# Patient Record
Sex: FEMALE | Race: BLACK OR AFRICAN AMERICAN
[De-identification: names, ages, dates, MRNs, and addresses within clinical notes are randomized per-mention and may not be internally consistent; named-entity substitution may affect disease eponyms.]

---

## 2017-10-03 ENCOUNTER — HOSPITAL ENCOUNTER (OUTPATIENT)
Dept: HOSPITAL 61 - MAMMO | Age: 57
Discharge: HOME | End: 2017-10-03
Attending: FAMILY MEDICINE
Payer: COMMERCIAL

## 2017-10-03 DIAGNOSIS — Z12.31: Primary | ICD-10-CM

## 2017-10-03 NOTE — RAD
DATE: 10/3/2017



EXAM: DIGITAL SCREEN BILAT W/CAD



HISTORY: Screening mammogram. Skin moles and lesions throughout both breasts.



COMPARISON: Mammogram from 2016, 2015 and 2014.



This study was interpreted with the benefit of Computerized Aided Detection

(CAD).



FINDINGS:



Breast Density: SCATTERED  The breast parenchyma shows scattered

fibroglandular densities. Breast parenchyma level B. 



 The skin and nipples are within normal limits.  No suspicious calcifications,

spiculated masses or areas of architectural distortion. Benign breast

calcification. Likely bilateral intraparenchymal lymph nodes.





IMPRESSION: No mammographic evidence of malignancy.





BI-RADS CATEGORY: 2 BENIGN FINDING(S)



RECOMMENDED FOLLOW-UP: 12M 12 MONTH FOLLOW-UP



PQRS compliance statement: Patient information was entered into a reminder

system with a target due date 10/3/2018 for the next mammogram.



Mammography is a sensitive method for finding small breast cancers, but it

does not detect them all and is not a substitute for careful clinical

examination.  A negative mammogram does not negate a clinically suspicious

finding and should not result in delay in biopsying a clinically suspicious

abnormality.



"Our facility is accredited by the [employer] Mammography

Program."

## 2018-10-12 ENCOUNTER — HOSPITAL ENCOUNTER (OUTPATIENT)
Dept: HOSPITAL 61 - MAMMO | Age: 58
Discharge: HOME | End: 2018-10-12
Attending: INTERNAL MEDICINE
Payer: COMMERCIAL

## 2018-10-12 DIAGNOSIS — Z12.31: Primary | ICD-10-CM

## 2018-10-12 PROCEDURE — 77067 SCR MAMMO BI INCL CAD: CPT

## 2018-10-15 NOTE — RAD
DATE: October 12, 2018



EXAM: DIGITAL SCREEN BILAT W/CAD



HISTORY: Screening study.



COMPARISON: 2016 and 2017



This study was interpreted with the benefit of Computerized Aided Detection

(CAD).







FINDINGS:



Breast Density: SCATTERED The breast parenchyma shows scattered fibroglandular

densities. Breast parenchyma level B..  There are no dominant suspicious

masses, suspicious microcalcifications or evidence of architectural

distortion.





IMPRESSION: No mammographic indicators for malignancy.







BI-RADS CATEGORY: 1 NEGATIVE



RECOMMENDED FOLLOW-UP: 12M 12 MONTH FOLLOW-UP



PQRS compliance statement: Patient information was entered into a reminder

system with a target due date October 13, 2019 for the next mammogram.



Mammography is a sensitive method for finding small breast cancers, but it

does not detect them all and is not a substitute for careful clinical

examination.  A negative mammogram does not negate a clinically suspicious

finding and should not result in delay in biopsying a clinically suspicious

abnormality.



"Our facility is accredited by the [employer] Mammography

Program."



The patient's breast density may affect the ability of mammography to detect

breast cancer. There are 4 categories of breast density, A, B, C and D. Breast

density A means that most of the breast tissue is replaced with adipose tissue

and therefore is not dense. Breast density B means that the breast tissue is

mildly dense and scattered. Breast density C means that the breast tissue is

heterogeneously dense. Breast density D means that the breast tissue is very

dense. Breast densities especially C and D may decrease the sensitivity of

mammography to detect breast cancer. Therefore, the patient may benefit from

3-D breast mammography (3D breast tomography) as a part of their screening

mammogram. Insurance may or may not pay for this additional imaging. The

patient's breast density based on today's mammogram is category B.

## 2019-03-09 ENCOUNTER — HOSPITAL ENCOUNTER (INPATIENT)
Dept: HOSPITAL 61 - ER | Age: 59
LOS: 3 days | Discharge: HOME | DRG: 440 | End: 2019-03-12
Attending: INTERNAL MEDICINE | Admitting: INTERNAL MEDICINE
Payer: COMMERCIAL

## 2019-03-09 VITALS — WEIGHT: 211.13 LBS | BODY MASS INDEX: 35.18 KG/M2 | HEIGHT: 65 IN

## 2019-03-09 DIAGNOSIS — R16.2: ICD-10-CM

## 2019-03-09 DIAGNOSIS — D25.9: ICD-10-CM

## 2019-03-09 DIAGNOSIS — K85.90: Primary | ICD-10-CM

## 2019-03-09 DIAGNOSIS — Z71.3: ICD-10-CM

## 2019-03-09 DIAGNOSIS — E11.9: ICD-10-CM

## 2019-03-09 DIAGNOSIS — K21.9: ICD-10-CM

## 2019-03-09 DIAGNOSIS — E66.9: ICD-10-CM

## 2019-03-09 LAB
ALBUMIN SERPL-MCNC: 3.9 G/DL (ref 3.4–5)
ALBUMIN/GLOB SERPL: 0.9 {RATIO} (ref 1–1.7)
ALP SERPL-CCNC: 92 U/L (ref 46–116)
ALT SERPL-CCNC: 21 U/L (ref 14–59)
ANION GAP SERPL CALC-SCNC: 11 MMOL/L (ref 6–14)
APTT PPP: YELLOW S
AST SERPL-CCNC: 14 U/L (ref 15–37)
BACTERIA #/AREA URNS HPF: (no result) /HPF
BASOPHILS # BLD AUTO: 0.1 X10^3/UL (ref 0–0.2)
BASOPHILS NFR BLD: 1 % (ref 0–3)
BILIRUB SERPL-MCNC: 0.4 MG/DL (ref 0.2–1)
BILIRUB UR QL STRIP: NEGATIVE
BUN SERPL-MCNC: 12 MG/DL (ref 7–20)
BUN/CREAT SERPL: 13 (ref 6–20)
CALCIUM SERPL-MCNC: 9.5 MG/DL (ref 8.5–10.1)
CHLORIDE SERPL-SCNC: 102 MMOL/L (ref 98–107)
CO2 SERPL-SCNC: 28 MMOL/L (ref 21–32)
CREAT SERPL-MCNC: 0.9 MG/DL (ref 0.6–1)
EOSINOPHIL NFR BLD: 0.2 X10^3/UL (ref 0–0.7)
EOSINOPHIL NFR BLD: 2 % (ref 0–3)
ERYTHROCYTE [DISTWIDTH] IN BLOOD BY AUTOMATED COUNT: 14.8 % (ref 11.5–14.5)
FIBRINOGEN PPP-MCNC: CLEAR MG/DL
GFR SERPLBLD BASED ON 1.73 SQ M-ARVRAT: 77.8 ML/MIN
GLOBULIN SER-MCNC: 4.4 G/DL (ref 2.2–3.8)
GLUCOSE SERPL-MCNC: 174 MG/DL (ref 70–99)
HCT VFR BLD CALC: 39.2 % (ref 36–47)
HGB BLD-MCNC: 13 G/DL (ref 12–15.5)
LIPASE: 1348 U/L (ref 73–393)
LYMPHOCYTES # BLD: 2.3 X10^3/UL (ref 1–4.8)
LYMPHOCYTES NFR BLD AUTO: 23 % (ref 24–48)
MCH RBC QN AUTO: 28 PG (ref 25–35)
MCHC RBC AUTO-ENTMCNC: 33 G/DL (ref 31–37)
MCV RBC AUTO: 84 FL (ref 79–100)
MONO #: 0.8 X10^3/UL (ref 0–1.1)
MONOCYTES NFR BLD: 8 % (ref 0–9)
NEUT #: 6.6 X10^3UL (ref 1.8–7.7)
NEUTROPHILS NFR BLD AUTO: 66 % (ref 31–73)
NITRITE UR QL STRIP: NEGATIVE
PH UR STRIP: 5.5 [PH]
PLATELET # BLD AUTO: 272 X10^3/UL (ref 140–400)
POTASSIUM SERPL-SCNC: 4 MMOL/L (ref 3.5–5.1)
PROT SERPL-MCNC: 8.3 G/DL (ref 6.4–8.2)
PROT UR STRIP-MCNC: NEGATIVE MG/DL
RBC # BLD AUTO: 4.66 X10^6/UL (ref 3.5–5.4)
RBC #/AREA URNS HPF: 0 /HPF (ref 0–2)
SODIUM SERPL-SCNC: 141 MMOL/L (ref 136–145)
SQUAMOUS #/AREA URNS LPF: (no result) /LPF
UROBILINOGEN UR-MCNC: 0.2 MG/DL
WBC # BLD AUTO: 10 X10^3/UL (ref 4–11)
WBC #/AREA URNS HPF: (no result) /HPF (ref 0–4)

## 2019-03-09 PROCEDURE — 80053 COMPREHEN METABOLIC PANEL: CPT

## 2019-03-09 PROCEDURE — 36415 COLL VENOUS BLD VENIPUNCTURE: CPT

## 2019-03-09 PROCEDURE — 84484 ASSAY OF TROPONIN QUANT: CPT

## 2019-03-09 PROCEDURE — 80061 LIPID PANEL: CPT

## 2019-03-09 PROCEDURE — 80076 HEPATIC FUNCTION PANEL: CPT

## 2019-03-09 PROCEDURE — 93005 ELECTROCARDIOGRAM TRACING: CPT

## 2019-03-09 PROCEDURE — 81025 URINE PREGNANCY TEST: CPT

## 2019-03-09 PROCEDURE — 96374 THER/PROPH/DIAG INJ IV PUSH: CPT

## 2019-03-09 PROCEDURE — 74177 CT ABD & PELVIS W/CONTRAST: CPT

## 2019-03-09 PROCEDURE — 82962 GLUCOSE BLOOD TEST: CPT

## 2019-03-09 PROCEDURE — 83690 ASSAY OF LIPASE: CPT

## 2019-03-09 PROCEDURE — 81001 URINALYSIS AUTO W/SCOPE: CPT

## 2019-03-09 PROCEDURE — 87086 URINE CULTURE/COLONY COUNT: CPT

## 2019-03-09 PROCEDURE — 82787 IGG 1 2 3 OR 4 EACH: CPT

## 2019-03-09 PROCEDURE — 85025 COMPLETE CBC W/AUTO DIFF WBC: CPT

## 2019-03-09 NOTE — PHYS DOC
Adult General


Chief Complaint


Chief Complaint:  ABDOMINAL PAIN





HPI


HPI





Patient is a 59  year old f wtih cc of abdo pain


epigastric x 3 days radiates to back wosre wtih eating, no fever sharp took 

motrin with minimal relief


no etoh


s/p kirit in the past.


on trulicity for diabetes.





Review of Systems


Review of Systems





Constitutional: Denies fever or chills []


Eyes: Denies change in visual acuity, redness, or eye pain []


HENT: Denies nasal congestion or sore throat []


Respiratory: Denies cough or shortness of breath []





Musculoskeletal: Denies back pain or joint pain []


Integument: Denies rash or skin lesions []


Neurologic: Denies headache, focal weakness or sensory changes []


Endocrine: Denies polyuria or polydipsia []





All other systems were reviewed and found to be within normal limits, except as 

documented in this note.





Current Medications


Current Medications





Current Medications








 Medications


  (Trade)  Dose


 Ordered  Sig/Scott  Start Time


 Stop Time Status Last Admin


Dose Admin


 


 Acetaminophen


  (Tylenol)  1,000 mg  1X  ONCE  3/10/19 00:00


 3/10/19 00:28 DC 3/10/19 00:38


1,000 MG


 


 Multi-Ingredient


 Mouthwash/Gargle


  (Gi Cocktail)  20 ml  1X  ONCE  3/10/19 00:00


 3/10/19 00:28 DC 3/10/19 00:38


20 ML


 


 Sodium Chloride  1,000 ml @ 


 1,000 mls/hr  1X  ONCE  3/10/19 00:00


 3/10/19 00:59 DC 3/10/19 00:40


1,000 MLS/HR











Physical Exam


Physical Exam





Constitutional: Well developed, well nourished, no acute distress, non-toxic 

appearance. []


HENT: Normocephalic, atraumatic, bilateral external ears normal, oropharynx 

moist, no oral exudates, nose normal. []


Eyes: PERRLA, EOMI, conjunctiva normal, no discharge. [] 


Neck: Normal range of motion, no tenderness, supple, no stridor. [] 


Cardiovascular:mild tachy


Lungs & Thorax:  Bilateral breath sounds clear to auscultation []


Abdomen: Bowel sounds normal, soft, epigastric tenderness, no masses, no 

pulsatile masses. [] 


Skin: Warm, dry, no erythema, no rash. [] 


Back: No tenderness, no CVA tenderness. [] 


Extremities: No tenderness, no cyanosis, no clubbing, ROM intact, no edema. [] 


Neurologic: Alert and oriented X 3, normal motor function, normal sensory 

function, no focal deficits noted. []


Psychologic: Affect normal, judgement normal, mood normal. []





Current Patient Data


Vital Signs





 Vital Signs








  Date Time  Temp Pulse Resp B/P (MAP) Pulse Ox O2 Delivery O2 Flow Rate FiO2


 


3/9/19 23:39 97.6 116 18 143/88 (106) 96   





 97.6       








Lab Values





 Laboratory Tests








Test


 3/9/19


22:35 3/9/19


22:48 3/9/19


23:35


 


Urine Collection Type Unknown    


 


Urine Color Yellow    


 


Urine Clarity Clear    


 


Urine pH 5.5    


 


Urine Specific Gravity 1.025    


 


Urine Protein


 Negative mg/dL


(NEG-TRACE) 


 





 


Urine Glucose (UA)


 Negative mg/dL


(NEG) 


 





 


Urine Ketones (Stick)


 Negative mg/dL


(NEG) 


 





 


Urine Blood


 Negative (NEG)


 


 





 


Urine Nitrite


 Negative (NEG)


 


 





 


Urine Bilirubin


 Negative (NEG)


 


 





 


Urine Urobilinogen Dipstick


 0.2 mg/dL (0.2


mg/dL) 


 





 


Urine Leukocyte Esterase Small (NEG)    


 


Urine RBC 0 /HPF (0-2)    


 


Urine WBC


 1-4 /HPF (0-4)


 


 





 


Urine Squamous Epithelial


Cells Many /LPF  


 


 





 


Urine Bacteria


 Few /HPF


(0-FEW) 


 





 


Urine Mucus Marked /LPF    


 


POC Urine HCG, Qualitative


 


 Hcg negative


(Negative) 





 


White Blood Count


 


 


 10.0 x10^3/uL


(4.0-11.0)


 


Red Blood Count


 


 


 4.66 x10^6/uL


(3.50-5.40)


 


Hemoglobin


 


 


 13.0 g/dL


(12.0-15.5)


 


Hematocrit


 


 


 39.2 %


(36.0-47.0)


 


Mean Corpuscular Volume


 


 


 84 fL ()





 


Mean Corpuscular Hemoglobin   28 pg (25-35)  


 


Mean Corpuscular Hemoglobin


Concent 


 


 33 g/dL


(31-37)


 


Red Cell Distribution Width


 


 


 14.8 %


(11.5-14.5)  H


 


Platelet Count


 


 


 272 x10^3/uL


(140-400)


 


Neutrophils (%) (Auto)   66 % (31-73)  


 


Lymphocytes (%) (Auto)   23 % (24-48)  L


 


Monocytes (%) (Auto)   8 % (0-9)  


 


Eosinophils (%) (Auto)   2 % (0-3)  


 


Basophils (%) (Auto)   1 % (0-3)  


 


Neutrophils # (Auto)


 


 


 6.6 x10^3uL


(1.8-7.7)


 


Lymphocytes # (Auto)


 


 


 2.3 x10^3/uL


(1.0-4.8)


 


Monocytes # (Auto)


 


 


 0.8 x10^3/uL


(0.0-1.1)


 


Eosinophils # (Auto)


 


 


 0.2 x10^3/uL


(0.0-0.7)


 


Basophils # (Auto)


 


 


 0.1 x10^3/uL


(0.0-0.2)


 


Sodium Level


 


 


 141 mmol/L


(136-145)


 


Potassium Level


 


 


 4.0 mmol/L


(3.5-5.1)


 


Chloride Level


 


 


 102 mmol/L


()


 


Carbon Dioxide Level


 


 


 28 mmol/L


(21-32)


 


Anion Gap   11 (6-14)  


 


Blood Urea Nitrogen


 


 


 12 mg/dL


(7-20)


 


Creatinine


 


 


 0.9 mg/dL


(0.6-1.0)


 


Estimated GFR


(Cockcroft-Gault) 


 


 77.8  





 


BUN/Creatinine Ratio   13 (6-20)  


 


Glucose Level


 


 


 174 mg/dL


(70-99)  H


 


Calcium Level


 


 


 9.5 mg/dL


(8.5-10.1)


 


Total Bilirubin


 


 


 0.4 mg/dL


(0.2-1.0)


 


Aspartate Amino Transferase


(AST) 


 


 14 U/L (15-37)


L


 


Alanine Aminotransferase (ALT)


 


 


 21 U/L (14-59)





 


Alkaline Phosphatase


 


 


 92 U/L


()


 


Troponin I Quantitative


 


 


 < 0.017 ng/mL


(0.000-0.055)


 


Total Protein


 


 


 8.3 g/dL


(6.4-8.2)  H


 


Albumin


 


 


 3.9 g/dL


(3.4-5.0)


 


Albumin/Globulin Ratio


 


 


 0.9 (1.0-1.7)


L


 


Lipase


 


 


 1348 U/L


()  H





 Laboratory Tests


3/9/19 23:35








 Laboratory Tests


3/9/19 23:35














EKG


EKG


[@2324; HR 114BPM; sinus tachycardia.no definite ischemic changes





Radiology/Procedures


Radiology/Procedures


[]


Impressions:





IMPRESSION:


1. Findings of acute edematous interstitial pancreatitis. No evidence of 


pseudocyst.


2. Fibroid uterus.


 


Electronically signed by: Cuco Amaya DO (3/10/2019 1:55 AM) [facility]














Course & Med Decision Making


Course & Med Decision Making


Pertinent Labs and Imaging studies reviewed. (See chart for details)





[]60 yo f with pancreatitis


first episode


unclear etiology


was tachy on arrival improved after fluids.





?trulicity


unsure exactly of etiology





will admit for gi consult, hydration pain control


admit to hospitalist service per usual local protocol





Dragon Disclaimer


Dragon Disclaimer


This electronic medical record was generated, in whole or in part, using a 

voice recognition dictation system.





Departure


Departure


Impression:  


 Primary Impression:  


 Pancreatitis


Disposition:  09 ADMITTED AS INPATIENT


Admitting Physician:  Other


Condition:  STABLE


Referrals:  


CL THOMPSON MD (PCP)











ERLINDA TSE MD Mar 9, 2019 23:31

## 2019-03-10 VITALS — SYSTOLIC BLOOD PRESSURE: 129 MMHG | DIASTOLIC BLOOD PRESSURE: 81 MMHG

## 2019-03-10 VITALS — DIASTOLIC BLOOD PRESSURE: 74 MMHG | SYSTOLIC BLOOD PRESSURE: 132 MMHG

## 2019-03-10 VITALS — DIASTOLIC BLOOD PRESSURE: 99 MMHG | SYSTOLIC BLOOD PRESSURE: 148 MMHG

## 2019-03-10 VITALS — DIASTOLIC BLOOD PRESSURE: 89 MMHG | SYSTOLIC BLOOD PRESSURE: 132 MMHG

## 2019-03-10 VITALS — SYSTOLIC BLOOD PRESSURE: 147 MMHG | DIASTOLIC BLOOD PRESSURE: 94 MMHG

## 2019-03-10 VITALS — SYSTOLIC BLOOD PRESSURE: 150 MMHG | DIASTOLIC BLOOD PRESSURE: 90 MMHG

## 2019-03-10 RX ADMIN — BACITRACIN SCH MLS/HR: 5000 INJECTION, POWDER, FOR SOLUTION INTRAMUSCULAR at 16:30

## 2019-03-10 RX ADMIN — BACITRACIN SCH MLS/HR: 5000 INJECTION, POWDER, FOR SOLUTION INTRAMUSCULAR at 03:34

## 2019-03-10 RX ADMIN — MORPHINE SULFATE PRN MG: 4 INJECTION, SOLUTION INTRAMUSCULAR; INTRAVENOUS at 00:39

## 2019-03-10 RX ADMIN — BACITRACIN SCH MLS/HR: 5000 INJECTION, POWDER, FOR SOLUTION INTRAMUSCULAR at 15:22

## 2019-03-10 RX ADMIN — MORPHINE SULFATE PRN MG: 4 INJECTION, SOLUTION INTRAMUSCULAR; INTRAVENOUS at 08:50

## 2019-03-10 NOTE — NUR
The patient, EBER LEONARDO, 58 y/o, F admitted by WILLIS GAMBLE MD, was given 
written information regarding hospital policies, unit procedures and contact persons. 
Patient arrived in room 560 at this time. Patient was transported via wheelchair by ED 
staff. 



Valuables were checked and noted. Patient is resting in bed with family at the bedside. 
Patient states no pain or other needs at this time. This RN will continue to monitor this 
patient.

## 2019-03-10 NOTE — RAD
PQRS Compliance statement:

 

One or more of the following individualized dose reduction techniques were

utilized for this examination:

1. Automated exposure control.

2. Adjustment of the mA and/or kV according to patient size.

3. Use of iterative reconstruction technique.

 

Indication:pancreatitis, r/o pseudocyst, abscess, OMNI 300, 75ml

 

TECHNIQUE: CT abdomen and pelvis with IV contrast with multiplanar 

reformats.

 

COMPARISON: None

 

FINDINGS:

Heart is normal in size. No pericardial or pleural effusion. Clear lung 

bases. Liver, spleen, adrenals within normal limits. Status post 

cholecystectomy. Mild peripancreatic inflammatory changes seen adjacent to

the pancreatic tail. No peripancreatic fluid collection. No 

nephrolithiasis or hydronephrosis. No enlarged retroperitoneal or pelvic 

adenopathy. No free pelvic fluid or ascites. No bowel obstruction. Normal 

appendix. Uterus is anteverted with multiple fibroids, the largest 

measuring 4.5 x 4.1 cm in the fundus. Urinary bladder is within normal 

limits. No pneumoperitoneum. No suspicious bony lesion.

 

IMPRESSION:

1. Findings of acute edematous interstitial pancreatitis. No evidence of 

pseudocyst.

2. Fibroid uterus.

 

Electronically signed by: Cuco Amaya DO (3/10/2019 1:55 AM) [email or web address]

## 2019-03-10 NOTE — PDOC1
History and Physical


Date of Admission


Date of Admission


3/10/19





Identification/Chief Complaint


Chief Complaint


my belly hurt





Source


Source:  Chart review, Patient





History of Present Illness


History of Present Illness


Patient is a 58 yo female with history of DM on Trulicity for 2 years who had 

been in her usual stated of health until his prior to her admission when she 

started complaining of epigastric pain with radiation to the back. The patient 

describes the pain as a sharp sensation 9-10 out of 10 intensity at the worst 

times intermittent not associated with nausea vomiting or diarrhea. The patient 

describes her diet very rich in cheese. But she has been doing this for a long 

time, the patient denies having fried foods, or any other dietary 

transgressions. The patient also carries a history of GERD on pantoprazole. She 

had a cholecystectomy for gallstones 30 years ago. She denies excessive alcohol 

intake no history of liver disease no history of black tarry stools nor 

hematemesis reported. She was found to have findings consistent with 

pancreatitis on imaging studies and laboratory data which confirms her 

diagnosis. Given that the patient has been on to elicit the this will have to 

be discontinued moving forward


Of care has been explained in detail to the patient addressed all the concerns 

of the best of my abilities





Past Medical History


GI:  GERD


Endocrine:  Diabetes





Past Surgical History


Past Surgical History:  Cholecystectomy





Social History


Smoke:  No


ALCOHOL:  rare





Current Medications


Current Medications





Current Medications








 Medications


  (Trade)  Dose


 Ordered  Sig/Scott  Start Time


 Stop Time Status Last Admin


Dose Admin


 


 Acetaminophen


  (Tylenol)  1,000 mg  1X  ONCE  3/10/19 00:00


 3/10/19 00:28 DC 3/10/19 00:38


1,000 MG


 


 Info


  (CONTRAST GIVEN


 -- Rx MONITORING)  1 each  PRN DAILY  PRN  3/10/19 01:30


 3/12/19 01:29   





 


 Iohexol


  (Omnipaque 300


 Mg/ml)  75 ml  1X  ONCE  3/10/19 01:30


 3/10/19 01:31 DC 3/10/19 01:44


75 ML


 


 Morphine Sulfate


  (Morphine


 Sulfate)  4 mg  PRN Q2HR  PRN  3/10/19 00:30


 3/11/19 00:29  3/10/19 08:50


4 MG


 


 Multi-Ingredient


 Mouthwash/Gargle


  (Gi Cocktail)  20 ml  1X  ONCE  3/10/19 00:00


 3/10/19 00:28 DC 3/10/19 00:38


20 ML


 


 Ondansetron HCl


  (Zofran)  4 mg  PRN Q8HRS  PRN  3/10/19 00:30


 3/11/19 00:29  3/10/19 00:40


4 MG


 


 Sodium Chloride  1,000 ml @ 


 125 mls/hr  Q8H  3/10/19 00:30


 3/11/19 00:29  3/10/19 03:34


125 MLS/HR











Allergies


Allergies





Allergies








Coded Allergies Type Severity Reaction Last Updated Verified


 


  No Known Drug Allergies    3/10/19 No











ROS


Review of System


CONSTITUTIONAL:        No fever or chills


EYES:                          No recent changes


SKIN:               No rash or itching


CARDIOVASCULAR:     No chest pain, syncope, palpitations, or edema


RESPIRATORY:            No SOB or cough


GASTROINTESTINAL:    No nausea, vomiting or abdominal pain


NEUROLOGICAL:          No headaches or weakness


ENDOCRINE:               No cold or heat intolerance


GENITOURINARY:         No urgency or frequency of urination


MUSCULOSKELETAL:   No back pain or joint pain


LYMPHATICS:               No enlarged lymph nodes


PSYCHIATRIC:              No anxiety or depression





Physical Exam


Physical Exam


GEN.:    No apparent distress.  Alert and oriented.


HEENT:    Head is normocephalic, atraumatic


NECK:    Supple.  


LUNGS:    Clear to auscultation.


HEART:    RRR, S1, S2 present.  Peripheral pulses intact


ABDOMEN:    Soft, nontender.  Positive bowel sounds.


EXTREMITIES:    Without any cyanosis.    


NEUROLOGIC:     Normal speech, normal tone


PSYCHIATRIC:    Normal affect, normal mood.


SKIN:   No ulcerations





Vitals


Vitals





Vital Signs








  Date Time  Temp Pulse Resp B/P (MAP) Pulse Ox O2 Delivery O2 Flow Rate FiO2


 


3/10/19 10:42 97.8 103 18 129/81 (97) 95 Room Air  





 97.8       











Labs


Labs





Laboratory Tests








Test


 3/9/19


22:35 3/9/19


22:48 3/9/19


23:35


 


Urine Collection Type Unknown   


 


Urine Color Yellow   


 


Urine Clarity Clear   


 


Urine pH 5.5   


 


Urine Specific Gravity 1.025   


 


Urine Protein


 Negative mg/dL


(NEG-TRACE) 


 





 


Urine Glucose (UA)


 Negative mg/dL


(NEG) 


 





 


Urine Ketones (Stick)


 Negative mg/dL


(NEG) 


 





 


Urine Blood Negative (NEG)   


 


Urine Nitrite Negative (NEG)   


 


Urine Bilirubin Negative (NEG)   


 


Urine Urobilinogen Dipstick


 0.2 mg/dL (0.2


mg/dL) 


 





 


Urine Leukocyte Esterase Small (NEG)   


 


Urine RBC 0 /HPF (0-2)   


 


Urine WBC 1-4 /HPF (0-4)   


 


Urine Squamous Epithelial


Cells Many /LPF 


 


 





 


Urine Bacteria


 Few /HPF


(0-FEW) 


 





 


Urine Mucus Marked /LPF   


 


Bedside Urine HCG, Qualitative


 


 Hcg negative


(Negative) 





 


White Blood Count


 


 


 10.0 x10^3/uL


(4.0-11.0)


 


Red Blood Count


 


 


 4.66 x10^6/uL


(3.50-5.40)


 


Hemoglobin


 


 


 13.0 g/dL


(12.0-15.5)


 


Hematocrit


 


 


 39.2 %


(36.0-47.0)


 


Mean Corpuscular Volume   84 fL () 


 


Mean Corpuscular Hemoglobin   28 pg (25-35) 


 


Mean Corpuscular Hemoglobin


Concent 


 


 33 g/dL


(31-37)


 


Red Cell Distribution Width


 


 


 14.8 %


(11.5-14.5)


 


Platelet Count


 


 


 272 x10^3/uL


(140-400)


 


Neutrophils (%) (Auto)   66 % (31-73) 


 


Lymphocytes (%) (Auto)   23 % (24-48) 


 


Monocytes (%) (Auto)   8 % (0-9) 


 


Eosinophils (%) (Auto)   2 % (0-3) 


 


Basophils (%) (Auto)   1 % (0-3) 


 


Neutrophils # (Auto)


 


 


 6.6 x10^3uL


(1.8-7.7)


 


Lymphocytes # (Auto)


 


 


 2.3 x10^3/uL


(1.0-4.8)


 


Monocytes # (Auto)


 


 


 0.8 x10^3/uL


(0.0-1.1)


 


Eosinophils # (Auto)


 


 


 0.2 x10^3/uL


(0.0-0.7)


 


Basophils # (Auto)


 


 


 0.1 x10^3/uL


(0.0-0.2)


 


Sodium Level


 


 


 141 mmol/L


(136-145)


 


Potassium Level


 


 


 4.0 mmol/L


(3.5-5.1)


 


Chloride Level


 


 


 102 mmol/L


()


 


Carbon Dioxide Level


 


 


 28 mmol/L


(21-32)


 


Anion Gap   11 (6-14) 


 


Blood Urea Nitrogen


 


 


 12 mg/dL


(7-20)


 


Creatinine


 


 


 0.9 mg/dL


(0.6-1.0)


 


Estimated GFR


(Cockcroft-Gault) 


 


 77.8 





 


BUN/Creatinine Ratio   13 (6-20) 


 


Glucose Level


 


 


 174 mg/dL


(70-99)


 


Calcium Level


 


 


 9.5 mg/dL


(8.5-10.1)


 


Total Bilirubin


 


 


 0.4 mg/dL


(0.2-1.0)


 


Aspartate Amino Transf


(AST/SGOT) 


 


 14 U/L (15-37) 





 


Alanine Aminotransferase


(ALT/SGPT) 


 


 21 U/L (14-59) 





 


Alkaline Phosphatase


 


 


 92 U/L


()


 


Troponin I Quantitative


 


 


 < 0.017 ng/mL


(0.000-0.055)


 


Total Protein


 


 


 8.3 g/dL


(6.4-8.2)


 


Albumin


 


 


 3.9 g/dL


(3.4-5.0)


 


Albumin/Globulin Ratio   0.9 (1.0-1.7) 


 


Lipase


 


 


 1348 U/L


()








Laboratory Tests








Test


 3/9/19


22:35 3/9/19


22:48 3/9/19


23:35


 


Urine Collection Type Unknown   


 


Urine Color Yellow   


 


Urine Clarity Clear   


 


Urine pH 5.5   


 


Urine Specific Gravity 1.025   


 


Urine Protein


 Negative mg/dL


(NEG-TRACE) 


 





 


Urine Glucose (UA)


 Negative mg/dL


(NEG) 


 





 


Urine Ketones (Stick)


 Negative mg/dL


(NEG) 


 





 


Urine Blood Negative (NEG)   


 


Urine Nitrite Negative (NEG)   


 


Urine Bilirubin Negative (NEG)   


 


Urine Urobilinogen Dipstick


 0.2 mg/dL (0.2


mg/dL) 


 





 


Urine Leukocyte Esterase Small (NEG)   


 


Urine RBC 0 /HPF (0-2)   


 


Urine WBC 1-4 /HPF (0-4)   


 


Urine Squamous Epithelial


Cells Many /LPF 


 


 





 


Urine Bacteria


 Few /HPF


(0-FEW) 


 





 


Urine Mucus Marked /LPF   


 


Bedside Urine HCG, Qualitative


 


 Hcg negative


(Negative) 





 


White Blood Count


 


 


 10.0 x10^3/uL


(4.0-11.0)


 


Red Blood Count


 


 


 4.66 x10^6/uL


(3.50-5.40)


 


Hemoglobin


 


 


 13.0 g/dL


(12.0-15.5)


 


Hematocrit


 


 


 39.2 %


(36.0-47.0)


 


Mean Corpuscular Volume   84 fL () 


 


Mean Corpuscular Hemoglobin   28 pg (25-35) 


 


Mean Corpuscular Hemoglobin


Concent 


 


 33 g/dL


(31-37)


 


Red Cell Distribution Width


 


 


 14.8 %


(11.5-14.5)


 


Platelet Count


 


 


 272 x10^3/uL


(140-400)


 


Neutrophils (%) (Auto)   66 % (31-73) 


 


Lymphocytes (%) (Auto)   23 % (24-48) 


 


Monocytes (%) (Auto)   8 % (0-9) 


 


Eosinophils (%) (Auto)   2 % (0-3) 


 


Basophils (%) (Auto)   1 % (0-3) 


 


Neutrophils # (Auto)


 


 


 6.6 x10^3uL


(1.8-7.7)


 


Lymphocytes # (Auto)


 


 


 2.3 x10^3/uL


(1.0-4.8)


 


Monocytes # (Auto)


 


 


 0.8 x10^3/uL


(0.0-1.1)


 


Eosinophils # (Auto)


 


 


 0.2 x10^3/uL


(0.0-0.7)


 


Basophils # (Auto)


 


 


 0.1 x10^3/uL


(0.0-0.2)


 


Sodium Level


 


 


 141 mmol/L


(136-145)


 


Potassium Level


 


 


 4.0 mmol/L


(3.5-5.1)


 


Chloride Level


 


 


 102 mmol/L


()


 


Carbon Dioxide Level


 


 


 28 mmol/L


(21-32)


 


Anion Gap   11 (6-14) 


 


Blood Urea Nitrogen


 


 


 12 mg/dL


(7-20)


 


Creatinine


 


 


 0.9 mg/dL


(0.6-1.0)


 


Estimated GFR


(Cockcroft-Gault) 


 


 77.8 





 


BUN/Creatinine Ratio   13 (6-20) 


 


Glucose Level


 


 


 174 mg/dL


(70-99)


 


Calcium Level


 


 


 9.5 mg/dL


(8.5-10.1)


 


Total Bilirubin


 


 


 0.4 mg/dL


(0.2-1.0)


 


Aspartate Amino Transf


(AST/SGOT) 


 


 14 U/L (15-37) 





 


Alanine Aminotransferase


(ALT/SGPT) 


 


 21 U/L (14-59) 





 


Alkaline Phosphatase


 


 


 92 U/L


()


 


Troponin I Quantitative


 


 


 < 0.017 ng/mL


(0.000-0.055)


 


Total Protein


 


 


 8.3 g/dL


(6.4-8.2)


 


Albumin


 


 


 3.9 g/dL


(3.4-5.0)


 


Albumin/Globulin Ratio   0.9 (1.0-1.7) 


 


Lipase


 


 


 1348 U/L


()











VTE Prophylaxis Ordered


VTE Prophylaxis Devices:  Yes


VTE Pharmacological Prophylaxi:  No





Assessment/Plan


Assessment/Plan


Acute pancreatitis


Abdominal pain secondary to the above resolved


Dietary transgression with excess fat in diet


History of GERD


Obesity with a BMI of 35








Plan: Continue with nothing by mouth except ice chips for comfort


IV fluid resuscitation with alar


We will readdress in the a.m.


Further recommendations based on the clinical course


DVT prophylaxis with SCD and BUDDY Phillips MD Mar 10, 2019 12:27

## 2019-03-10 NOTE — PDOC2
CONSULT


Date of Consult


Date of Consult


DATE: 3/10/19 


TIME: 10:20





Reason for Consult


Reason for Consult:


acute pancreatitis





History of Present Illness


Reason for Visit:


This is a 58 yo female with history of DM on Trulicity for 2 years and with 

history of GERD on pantoprazole and cholecystectomy for gallstones 30 years 

ago. Presents now with 2 days of epigastric pain-  she thought from augmentin 

which gives her gas but progressed.    Cme to ER where CT and labs suggest 

pancreatitis-  new onset -  she denies alcohol and NO prior history of 

pancreatitis.


Prior to now, only occasional gas, no abd pain or severe dyspeptic symptoms, 

only heartburn controlled by PPI.       Had colonoscopy about 5 years ago-  

small polyp removed.





Past Medical History


GI:  GERD


Endocrine:  Diabetes





Past Surgical History


Past Surgical History:  Cholecystectomy





Social History


No


ALCOHOL:  rare





Current Medications


Current Medications





Current Medications


Sodium Chloride 1,000 ml @  1,000 mls/hr 1X  ONCE IV  Last administered on 3/10/

19at 00:40;  Start 3/10/19 at 00:00;  Stop 3/10/19 at 00:59;  Status DC


Multi-Ingredient Mouthwash/Gargle (Gi Cocktail) 20 ml 1X  ONCE SWSW  Last 

administered on 3/10/19at 00:38;  Start 3/10/19 at 00:00;  Stop 3/10/19 at 00:28

;  Status DC


Acetaminophen (Tylenol) 1,000 mg 1X  ONCE PO  Last administered on 3/10/19at 00:

38;  Start 3/10/19 at 00:00;  Stop 3/10/19 at 00:28;  Status DC


Ondansetron HCl (Zofran) 4 mg PRN Q8HRS  PRN IV NAUSEA/VOMITING Last 

administered on 3/10/19at 00:40;  Start 3/10/19 at 00:30;  Stop 3/11/19 at 00:29


Morphine Sulfate (Morphine Sulfate) 4 mg PRN Q2HR  PRN IV PAIN Last 

administered on 3/10/19at 08:50;  Start 3/10/19 at 00:30;  Stop 3/11/19 at 00:29


Sodium Chloride 1,000 ml @  125 mls/hr Q8H IV  Last administered on 3/10/19at 03

:34;  Start 3/10/19 at 00:30;  Stop 3/11/19 at 00:29


Iohexol (Omnipaque 300 Mg/ml) 75 ml 1X  ONCE IV  Last administered on 3/10/19at 

01:44;  Start 3/10/19 at 01:30;  Stop 3/10/19 at 01:31;  Status DC


Info (CONTRAST GIVEN -- Rx MONITORING) 1 each PRN DAILY  PRN MC SEE COMMENTS;  

Start 3/10/19 at 01:30;  Stop 3/12/19 at 01:29





Allergies


Allergies:  


Coded Allergies:  


     No Known Drug Allergies (Unverified , 3/10/19)





ROS


Gastrointestinal:  Yes Abdominal Pain





Physical Exam


General:  Alert, Oriented X3


HEENT:  PERRLA


Lungs:  Clear to auscultation


Heart:  Regular rate, Normal S1, Normal S2


Abdomen:  Normal bowel sounds, Soft, No hepatosplenomegaly, Other (very mild 

tenderness)


Extremities:  No clubbing, No cyanosis


Neuro:  Normal gait, Normal speech


Psych/Mental Status:  Mental status NL





Vitals


VITALS





Vital Signs








  Date Time  Temp Pulse Resp B/P (MAP) Pulse Ox O2 Delivery O2 Flow Rate FiO2


 


3/10/19 08:50     96 Room Air  


 


3/10/19 07:00 98.3 103 18 132/89 (103)    





 98.3       











Labs


Labs





Laboratory Tests








Test


 3/9/19


22:35 3/9/19


22:48 3/9/19


23:35


 


Urine Collection Type Unknown   


 


Urine Color Yellow   


 


Urine Clarity Clear   


 


Urine pH 5.5   


 


Urine Specific Gravity 1.025   


 


Urine Protein


 Negative mg/dL


(NEG-TRACE) 


 





 


Urine Glucose (UA)


 Negative mg/dL


(NEG) 


 





 


Urine Ketones (Stick)


 Negative mg/dL


(NEG) 


 





 


Urine Blood Negative (NEG)   


 


Urine Nitrite Negative (NEG)   


 


Urine Bilirubin Negative (NEG)   


 


Urine Urobilinogen Dipstick


 0.2 mg/dL (0.2


mg/dL) 


 





 


Urine Leukocyte Esterase Small (NEG)   


 


Urine RBC 0 /HPF (0-2)   


 


Urine WBC 1-4 /HPF (0-4)   


 


Urine Squamous Epithelial


Cells Many /LPF 


 


 





 


Urine Bacteria


 Few /HPF


(0-FEW) 


 





 


Urine Mucus Marked /LPF   


 


Bedside Urine HCG, Qualitative


 


 Hcg negative


(Negative) 





 


White Blood Count


 


 


 10.0 x10^3/uL


(4.0-11.0)


 


Red Blood Count


 


 


 4.66 x10^6/uL


(3.50-5.40)


 


Hemoglobin


 


 


 13.0 g/dL


(12.0-15.5)


 


Hematocrit


 


 


 39.2 %


(36.0-47.0)


 


Mean Corpuscular Volume   84 fL () 


 


Mean Corpuscular Hemoglobin   28 pg (25-35) 


 


Mean Corpuscular Hemoglobin


Concent 


 


 33 g/dL


(31-37)


 


Red Cell Distribution Width


 


 


 14.8 %


(11.5-14.5)


 


Platelet Count


 


 


 272 x10^3/uL


(140-400)


 


Neutrophils (%) (Auto)   66 % (31-73) 


 


Lymphocytes (%) (Auto)   23 % (24-48) 


 


Monocytes (%) (Auto)   8 % (0-9) 


 


Eosinophils (%) (Auto)   2 % (0-3) 


 


Basophils (%) (Auto)   1 % (0-3) 


 


Neutrophils # (Auto)


 


 


 6.6 x10^3uL


(1.8-7.7)


 


Lymphocytes # (Auto)


 


 


 2.3 x10^3/uL


(1.0-4.8)


 


Monocytes # (Auto)


 


 


 0.8 x10^3/uL


(0.0-1.1)


 


Eosinophils # (Auto)


 


 


 0.2 x10^3/uL


(0.0-0.7)


 


Basophils # (Auto)


 


 


 0.1 x10^3/uL


(0.0-0.2)


 


Sodium Level


 


 


 141 mmol/L


(136-145)


 


Potassium Level


 


 


 4.0 mmol/L


(3.5-5.1)


 


Chloride Level


 


 


 102 mmol/L


()


 


Carbon Dioxide Level


 


 


 28 mmol/L


(21-32)


 


Anion Gap   11 (6-14) 


 


Blood Urea Nitrogen


 


 


 12 mg/dL


(7-20)


 


Creatinine


 


 


 0.9 mg/dL


(0.6-1.0)


 


Estimated GFR


(Cockcroft-Gault) 


 


 77.8 





 


BUN/Creatinine Ratio   13 (6-20) 


 


Glucose Level


 


 


 174 mg/dL


(70-99)


 


Calcium Level


 


 


 9.5 mg/dL


(8.5-10.1)


 


Total Bilirubin


 


 


 0.4 mg/dL


(0.2-1.0)


 


Aspartate Amino Transf


(AST/SGOT) 


 


 14 U/L (15-37) 





 


Alanine Aminotransferase


(ALT/SGPT) 


 


 21 U/L (14-59) 





 


Alkaline Phosphatase


 


 


 92 U/L


()


 


Troponin I Quantitative


 


 


 < 0.017 ng/mL


(0.000-0.055)


 


Total Protein


 


 


 8.3 g/dL


(6.4-8.2)


 


Albumin


 


 


 3.9 g/dL


(3.4-5.0)


 


Albumin/Globulin Ratio   0.9 (1.0-1.7) 


 


Lipase


 


 


 1348 U/L


()








Laboratory Tests








Test


 3/9/19


22:35 3/9/19


22:48 3/9/19


23:35


 


Urine Collection Type Unknown   


 


Urine Color Yellow   


 


Urine Clarity Clear   


 


Urine pH 5.5   


 


Urine Specific Gravity 1.025   


 


Urine Protein


 Negative mg/dL


(NEG-TRACE) 


 





 


Urine Glucose (UA)


 Negative mg/dL


(NEG) 


 





 


Urine Ketones (Stick)


 Negative mg/dL


(NEG) 


 





 


Urine Blood Negative (NEG)   


 


Urine Nitrite Negative (NEG)   


 


Urine Bilirubin Negative (NEG)   


 


Urine Urobilinogen Dipstick


 0.2 mg/dL (0.2


mg/dL) 


 





 


Urine Leukocyte Esterase Small (NEG)   


 


Urine RBC 0 /HPF (0-2)   


 


Urine WBC 1-4 /HPF (0-4)   


 


Urine Squamous Epithelial


Cells Many /LPF 


 


 





 


Urine Bacteria


 Few /HPF


(0-FEW) 


 





 


Urine Mucus Marked /LPF   


 


Bedside Urine HCG, Qualitative


 


 Hcg negative


(Negative) 





 


White Blood Count


 


 


 10.0 x10^3/uL


(4.0-11.0)


 


Red Blood Count


 


 


 4.66 x10^6/uL


(3.50-5.40)


 


Hemoglobin


 


 


 13.0 g/dL


(12.0-15.5)


 


Hematocrit


 


 


 39.2 %


(36.0-47.0)


 


Mean Corpuscular Volume   84 fL () 


 


Mean Corpuscular Hemoglobin   28 pg (25-35) 


 


Mean Corpuscular Hemoglobin


Concent 


 


 33 g/dL


(31-37)


 


Red Cell Distribution Width


 


 


 14.8 %


(11.5-14.5)


 


Platelet Count


 


 


 272 x10^3/uL


(140-400)


 


Neutrophils (%) (Auto)   66 % (31-73) 


 


Lymphocytes (%) (Auto)   23 % (24-48) 


 


Monocytes (%) (Auto)   8 % (0-9) 


 


Eosinophils (%) (Auto)   2 % (0-3) 


 


Basophils (%) (Auto)   1 % (0-3) 


 


Neutrophils # (Auto)


 


 


 6.6 x10^3uL


(1.8-7.7)


 


Lymphocytes # (Auto)


 


 


 2.3 x10^3/uL


(1.0-4.8)


 


Monocytes # (Auto)


 


 


 0.8 x10^3/uL


(0.0-1.1)


 


Eosinophils # (Auto)


 


 


 0.2 x10^3/uL


(0.0-0.7)


 


Basophils # (Auto)


 


 


 0.1 x10^3/uL


(0.0-0.2)


 


Sodium Level


 


 


 141 mmol/L


(136-145)


 


Potassium Level


 


 


 4.0 mmol/L


(3.5-5.1)


 


Chloride Level


 


 


 102 mmol/L


()


 


Carbon Dioxide Level


 


 


 28 mmol/L


(21-32)


 


Anion Gap   11 (6-14) 


 


Blood Urea Nitrogen


 


 


 12 mg/dL


(7-20)


 


Creatinine


 


 


 0.9 mg/dL


(0.6-1.0)


 


Estimated GFR


(Cockcroft-Gault) 


 


 77.8 





 


BUN/Creatinine Ratio   13 (6-20) 


 


Glucose Level


 


 


 174 mg/dL


(70-99)


 


Calcium Level


 


 


 9.5 mg/dL


(8.5-10.1)


 


Total Bilirubin


 


 


 0.4 mg/dL


(0.2-1.0)


 


Aspartate Amino Transf


(AST/SGOT) 


 


 14 U/L (15-37) 





 


Alanine Aminotransferase


(ALT/SGPT) 


 


 21 U/L (14-59) 





 


Alkaline Phosphatase


 


 


 92 U/L


()


 


Troponin I Quantitative


 


 


 < 0.017 ng/mL


(0.000-0.055)


 


Total Protein


 


 


 8.3 g/dL


(6.4-8.2)


 


Albumin


 


 


 3.9 g/dL


(3.4-5.0)


 


Albumin/Globulin Ratio   0.9 (1.0-1.7) 


 


Lipase


 


 


 1348 U/L


()











Images


Images


CT- pancreatitis without mass or CBD dilation   s/p kirit





Assessment/Plan


Assessment/Plan


Acute pancreatitis-  apparently new-   main risk factor is not alcohol or 

gallstones (s/p kirit and no LFT abnormalities or CBD changes on CT).      Has 

been on Trulicity for 2 years but this could be related as trigger.








Plan- stop Trulicity and later consult with endocrine regarding options going 

forward


          NPO then CLD with pain control for pancreatitis











DHARMESH CHARLTON MD Mar 10, 2019 10:33

## 2019-03-11 VITALS — SYSTOLIC BLOOD PRESSURE: 159 MMHG | DIASTOLIC BLOOD PRESSURE: 82 MMHG

## 2019-03-11 VITALS — SYSTOLIC BLOOD PRESSURE: 143 MMHG | DIASTOLIC BLOOD PRESSURE: 84 MMHG

## 2019-03-11 VITALS — SYSTOLIC BLOOD PRESSURE: 145 MMHG | DIASTOLIC BLOOD PRESSURE: 84 MMHG

## 2019-03-11 VITALS — SYSTOLIC BLOOD PRESSURE: 145 MMHG | DIASTOLIC BLOOD PRESSURE: 95 MMHG

## 2019-03-11 VITALS — SYSTOLIC BLOOD PRESSURE: 147 MMHG | DIASTOLIC BLOOD PRESSURE: 87 MMHG

## 2019-03-11 VITALS — DIASTOLIC BLOOD PRESSURE: 95 MMHG | SYSTOLIC BLOOD PRESSURE: 149 MMHG

## 2019-03-11 LAB
ALBUMIN SERPL-MCNC: 3.2 G/DL (ref 3.4–5)
ALP SERPL-CCNC: 76 U/L (ref 46–116)
ALT SERPL-CCNC: 20 U/L (ref 14–59)
AST SERPL-CCNC: 14 U/L (ref 15–37)
BILIRUB DIRECT SERPL-MCNC: 0.1 MG/DL (ref 0–0.2)
BILIRUB SERPL-MCNC: 0.4 MG/DL (ref 0.2–1)
CHOLEST SERPL-MCNC: 140 MG/DL (ref 0–200)
CHOLEST/HDLC SERPL: 3.9 {RATIO}
HDLC SERPL-MCNC: 36 MG/DL (ref 40–60)
LDLC: 92 MG/DL (ref 0–100)
LIPASE: 956 U/L (ref 73–393)
PROT SERPL-MCNC: 7.2 G/DL (ref 6.4–8.2)
TRIGL SERPL-MCNC: 60 MG/DL (ref 0–150)
VLDLC: 12 MG/DL (ref 0–40)

## 2019-03-11 RX ADMIN — PANTOPRAZOLE SODIUM SCH MG: 40 TABLET, DELAYED RELEASE ORAL at 14:49

## 2019-03-11 RX ADMIN — LISINOPRIL SCH MG: 10 TABLET ORAL at 14:49

## 2019-03-11 RX ADMIN — LINAGLIPTIN SCH MG: 5 TABLET, FILM COATED ORAL at 14:49

## 2019-03-11 NOTE — PDOC
Subjective:


Subjective:


Feels better today - would like to eat/drink.


Maybe very minimal epigastric discomfort - no n/v, hasn't needed pain meds.





Objective:


Vital Signs:





 Vital Signs








  Date Time  Temp Pulse Resp B/P (MAP) Pulse Ox O2 Delivery O2 Flow Rate FiO2


 


3/11/19 07:00 97.9 98 18 143/84 (103) 99 Room Air  





 97.9       








Labs:





Laboratory Tests








Test


 3/11/19


06:22 3/11/19


07:54


 


Total Bilirubin 0.4 mg/dL  


 


Direct Bilirubin 0.1 mg/dL  


 


Aspartate Amino Transf


(AST/SGOT) 14 U/L 


 





 


Alanine Aminotransferase


(ALT/SGPT) 20 U/L 


 





 


Alkaline Phosphatase 76 U/L  


 


Total Protein 7.2 g/dL  


 


Albumin 3.2 g/dL  


 


Lipase 956 U/L  


 


Glucose (Fingerstick)  110 mg/dL 








Imaging:


CT A/P


IMPRESSION:


1. Findings of acute edematous interstitial pancreatitis. No evidence of 

pseudocyst.


2. Fibroid uterus.





PE:





GEN: NAD


LUNGS: CTAB


HEART: RRR


ABD: S/ND, not particularly tender


NEURO/PSYCH: A & O 3





A/P:


Pancreatitis - ?related to Trulicity


-s/p kirit, no alcohol, Ca++ WNL





Heartburn - on PPI, no previous EGD





CRC screen, h/o polyp - colonoscopy ~5 years ago





--


Try EMIGDIO blackburn - d/w RN and Dr. Nj.


Can check lipids and IgG4 for completeness.


Consider referral to endocrinology as outpt.


Screening colonoscopy as outpt, consider tandem EGD as well.











ELSY DOBSON Mar 11, 2019 11:19

## 2019-03-11 NOTE — EKG
[facility]

              [address] [place] [address]

Test Date:    2019               Test Time:    23:24:44

Pat Name:     EBER LEONARDO             Department:   

Patient ID:   [id number]           Room:         [facility]

Gender:       F                        Technician:   

:          [id number]               Requested By: ERLINDA TSE

Order Number: [id number]           Reading MD:   Gary Sandhu MD

                                 Measurements

Intervals                              Axis          

Rate:         114                      P:            45

VA:           148                      QRS:          65

QRSD:         88                       T:            19

QT:           284                                    

QTc:          394                                    

                           Interpretive Statements

SINUS TACHYCARDIA

NON-SPECIFIC ST/T CHANGES

Electronically Signed On 3- 22:12:06 CDT by Gary Sandhu MD

## 2019-03-11 NOTE — PDOC
PROGRESS NOTES


Chief Complaint


Chief Complaint


Acute pancreatitis


Abdominal pain secondary to the above resolved


Dietary transgression with excess fat in diet


History of GERD


Obesity with a BMI of 35








Plan: 


start clear liquids today


We will reassess in the a.m.


Further recommendations based on the clinical course


DVT prophylaxis with SCD and teds





History of Present Illness


History of Present Illness


Patient feeling better today, she has regained her appetite. We will resume her 

home medications and hopefully advance her diet in hopes to discharge soon





Vitals


Vitals





Vital Signs








  Date Time  Temp Pulse Resp B/P (MAP) Pulse Ox O2 Delivery O2 Flow Rate FiO2


 


3/11/19 11:10 97.9 98 18 147/87 (107) 96 Room Air  





 97.9       











Physical Exam


General:  Alert, Oriented X3


Heart:  Regular rate, Normal S1, Normal S2


Abdomen:  Normal bowel sounds, Soft, No hepatosplenomegaly, Other (very mild 

tenderness)


Extremities:  No clubbing, No cyanosis





Labs


LABS





Laboratory Tests








Test


 3/11/19


06:22 3/11/19


07:54 3/11/19


11:12


 


Total Bilirubin


 0.4 mg/dL


(0.2-1.0) 


 





 


Direct Bilirubin


 0.1 mg/dL


(0.0-0.2) 


 





 


Aspartate Amino Transf


(AST/SGOT) 14 U/L (15-37) 


 


 





 


Alanine Aminotransferase


(ALT/SGPT) 20 U/L (14-59) 


 


 





 


Alkaline Phosphatase


 76 U/L


() 


 





 


Total Protein


 7.2 g/dL


(6.4-8.2) 


 





 


Albumin


 3.2 g/dL


(3.4-5.0) 


 





 


Triglycerides Level


 60 mg/dL


(0-150) 


 





 


Cholesterol Level


 140 mg/dL


(0-200) 


 





 


LDL Cholesterol, Calculated


 92 mg/dL


(0-100) 


 





 


VLDL Cholesterol, Calculated


 12 mg/dL


(0-40) 


 





 


Non-HDL Cholesterol Calculated


 104 mg/dL


(0-129) 


 





 


HDL Cholesterol


 36 mg/dL


(40-60) 


 





 


Cholesterol/HDL Ratio 3.9   


 


Lipase


 956 U/L


() 


 





 


Glucose (Fingerstick)


 


 110 mg/dL


(70-99) 115 mg/dL


(70-99)











Comment


Review of Relevant


I have reviewed the following items mery (where applicable) has been applied.


Labs





Laboratory Tests








Test


 3/9/19


22:35 3/9/19


22:48 3/9/19


23:35 3/11/19


06:22


 


Urine Collection Type Unknown    


 


Urine Color Yellow    


 


Urine Clarity Clear    


 


Urine pH 5.5    


 


Urine Specific Gravity 1.025    


 


Urine Protein


 Negative mg/dL


(NEG-TRACE) 


 


 





 


Urine Glucose (UA)


 Negative mg/dL


(NEG) 


 


 





 


Urine Ketones (Stick)


 Negative mg/dL


(NEG) 


 


 





 


Urine Blood Negative (NEG)    


 


Urine Nitrite Negative (NEG)    


 


Urine Bilirubin Negative (NEG)    


 


Urine Urobilinogen Dipstick


 0.2 mg/dL (0.2


mg/dL) 


 


 





 


Urine Leukocyte Esterase Small (NEG)    


 


Urine RBC 0 /HPF (0-2)    


 


Urine WBC 1-4 /HPF (0-4)    


 


Urine Squamous Epithelial


Cells Many /LPF 


 


 


 





 


Urine Bacteria


 Few /HPF


(0-FEW) 


 


 





 


Urine Mucus Marked /LPF    


 


Bedside Urine HCG, Qualitative


 


 Hcg negative


(Negative) 


 





 


White Blood Count


 


 


 10.0 x10^3/uL


(4.0-11.0) 





 


Red Blood Count


 


 


 4.66 x10^6/uL


(3.50-5.40) 





 


Hemoglobin


 


 


 13.0 g/dL


(12.0-15.5) 





 


Hematocrit


 


 


 39.2 %


(36.0-47.0) 





 


Mean Corpuscular Volume   84 fL ()  


 


Mean Corpuscular Hemoglobin   28 pg (25-35)  


 


Mean Corpuscular Hemoglobin


Concent 


 


 33 g/dL


(31-37) 





 


Red Cell Distribution Width


 


 


 14.8 %


(11.5-14.5) 





 


Platelet Count


 


 


 272 x10^3/uL


(140-400) 





 


Neutrophils (%) (Auto)   66 % (31-73)  


 


Lymphocytes (%) (Auto)   23 % (24-48)  


 


Monocytes (%) (Auto)   8 % (0-9)  


 


Eosinophils (%) (Auto)   2 % (0-3)  


 


Basophils (%) (Auto)   1 % (0-3)  


 


Neutrophils # (Auto)


 


 


 6.6 x10^3uL


(1.8-7.7) 





 


Lymphocytes # (Auto)


 


 


 2.3 x10^3/uL


(1.0-4.8) 





 


Monocytes # (Auto)


 


 


 0.8 x10^3/uL


(0.0-1.1) 





 


Eosinophils # (Auto)


 


 


 0.2 x10^3/uL


(0.0-0.7) 





 


Basophils # (Auto)


 


 


 0.1 x10^3/uL


(0.0-0.2) 





 


Sodium Level


 


 


 141 mmol/L


(136-145) 





 


Potassium Level


 


 


 4.0 mmol/L


(3.5-5.1) 





 


Chloride Level


 


 


 102 mmol/L


() 





 


Carbon Dioxide Level


 


 


 28 mmol/L


(21-32) 





 


Anion Gap   11 (6-14)  


 


Blood Urea Nitrogen


 


 


 12 mg/dL


(7-20) 





 


Creatinine


 


 


 0.9 mg/dL


(0.6-1.0) 





 


Estimated GFR


(Cockcroft-Gault) 


 


 77.8 


 





 


BUN/Creatinine Ratio   13 (6-20)  


 


Glucose Level


 


 


 174 mg/dL


(70-99) 





 


Calcium Level


 


 


 9.5 mg/dL


(8.5-10.1) 





 


Total Bilirubin


 


 


 0.4 mg/dL


(0.2-1.0) 0.4 mg/dL


(0.2-1.0)


 


Aspartate Amino Transf


(AST/SGOT) 


 


 14 U/L (15-37) 


 14 U/L (15-37) 





 


Alanine Aminotransferase


(ALT/SGPT) 


 


 21 U/L (14-59) 


 20 U/L (14-59) 





 


Alkaline Phosphatase


 


 


 92 U/L


() 76 U/L


()


 


Troponin I Quantitative


 


 


 < 0.017 ng/mL


(0.000-0.055) 





 


Total Protein


 


 


 8.3 g/dL


(6.4-8.2) 7.2 g/dL


(6.4-8.2)


 


Albumin


 


 


 3.9 g/dL


(3.4-5.0) 3.2 g/dL


(3.4-5.0)


 


Albumin/Globulin Ratio   0.9 (1.0-1.7)  


 


Lipase


 


 


 1348 U/L


() 956 U/L


()


 


Direct Bilirubin


 


 


 


 0.1 mg/dL


(0.0-0.2)


 


Triglycerides Level


 


 


 


 60 mg/dL


(0-150)


 


Cholesterol Level


 


 


 


 140 mg/dL


(0-200)


 


LDL Cholesterol, Calculated


 


 


 


 92 mg/dL


(0-100)


 


VLDL Cholesterol, Calculated


 


 


 


 12 mg/dL


(0-40)


 


Non-HDL Cholesterol Calculated


 


 


 


 104 mg/dL


(0-129)


 


HDL Cholesterol


 


 


 


 36 mg/dL


(40-60)


 


Cholesterol/HDL Ratio    3.9 


 


Test


 3/11/19


07:54 3/11/19


11:12 


 





 


Glucose (Fingerstick)


 110 mg/dL


(70-99) 115 mg/dL


(70-99) 


 











Laboratory Tests








Test


 3/11/19


06:22 3/11/19


07:54 3/11/19


11:12


 


Total Bilirubin


 0.4 mg/dL


(0.2-1.0) 


 





 


Direct Bilirubin


 0.1 mg/dL


(0.0-0.2) 


 





 


Aspartate Amino Transf


(AST/SGOT) 14 U/L (15-37) 


 


 





 


Alanine Aminotransferase


(ALT/SGPT) 20 U/L (14-59) 


 


 





 


Alkaline Phosphatase


 76 U/L


() 


 





 


Total Protein


 7.2 g/dL


(6.4-8.2) 


 





 


Albumin


 3.2 g/dL


(3.4-5.0) 


 





 


Triglycerides Level


 60 mg/dL


(0-150) 


 





 


Cholesterol Level


 140 mg/dL


(0-200) 


 





 


LDL Cholesterol, Calculated


 92 mg/dL


(0-100) 


 





 


VLDL Cholesterol, Calculated


 12 mg/dL


(0-40) 


 





 


Non-HDL Cholesterol Calculated


 104 mg/dL


(0-129) 


 





 


HDL Cholesterol


 36 mg/dL


(40-60) 


 





 


Cholesterol/HDL Ratio 3.9   


 


Lipase


 956 U/L


() 


 





 


Glucose (Fingerstick)


 


 110 mg/dL


(70-99) 115 mg/dL


(70-99)








Medications





Current Medications


Sodium Chloride 1,000 ml @  1,000 mls/hr 1X  ONCE IV  Last administered on 3/10/

19at 00:40;  Start 3/10/19 at 00:00;  Stop 3/10/19 at 00:59;  Status DC


Multi-Ingredient Mouthwash/Gargle (Gi Cocktail) 20 ml 1X  ONCE SWSW  Last 

administered on 3/10/19at 00:38;  Start 3/10/19 at 00:00;  Stop 3/10/19 at 00:28

;  Status DC


Acetaminophen (Tylenol) 1,000 mg 1X  ONCE PO  Last administered on 3/10/19at 00:

38;  Start 3/10/19 at 00:00;  Stop 3/10/19 at 00:28;  Status DC


Ondansetron HCl (Zofran) 4 mg PRN Q8HRS  PRN IV NAUSEA/VOMITING Last 

administered on 3/10/19at 00:40;  Start 3/10/19 at 00:30;  Stop 3/11/19 at 00:29

;  Status DC


Morphine Sulfate (Morphine Sulfate) 4 mg PRN Q2HR  PRN IV PAIN Last 

administered on 3/10/19at 08:50;  Start 3/10/19 at 00:30;  Stop 3/11/19 at 00:29

;  Status DC


Sodium Chloride 1,000 ml @  125 mls/hr Q8H IV  Last administered on 3/10/19at 15

:22;  Start 3/10/19 at 00:30;  Stop 3/11/19 at 00:29;  Status DC


Iohexol (Omnipaque 300 Mg/ml) 75 ml 1X  ONCE IV  Last administered on 3/10/19at 

01:44;  Start 3/10/19 at 01:30;  Stop 3/10/19 at 01:31;  Status DC


Info (CONTRAST GIVEN -- Rx MONITORING) 1 each PRN DAILY  PRN MC SEE COMMENTS;  

Start 3/10/19 at 01:30;  Stop 3/12/19 at 01:29





Active Scripts


Active


Reported


Lipitor (Atorvastatin Calcium) 20 Mg Tablet 20 Mg PO HS


Zyrtec (Cetirizine Hcl) 10 Mg Capsule 10 Mg PO DAILY PRN


Vitamin D2 (Ergocalciferol (Vitamin D2)) 50,000 Unit Capsule 1 Cap PO WEEKLY


Tradjenta (Linagliptin) 5 Mg Tablet 5 Mg PO DAILY


Lisinopril 10 Mg Tablet 1 Tab PO DAILY


Fluticasone Propionate Nasal Spray (Fluticasone Propionate) 16 Gm Spray.susp 2 

Spray NS HS


Gabapentin ** (Gabapentin) 300 Mg Capsule 300 Mg PO HS


Protonix (Pantoprazole Sodium) 20 Mg Tablet.dr 1 Tab PO DAILY


Metformin Hcl 500 Mg Tablet 500 Mg PO BIDWMEALS


Vitals/I & O





Vital Sign - Last 24 Hours








 3/10/19 3/10/19 3/10/19 3/10/19





 14:36 19:00 20:00 23:00


 


Temp 97.9   98.7





 97.9   98.7


 


Pulse 101 102  105


 


Resp 18 19  20


 


B/P (MAP) 132/74 (93) 150/90 (110)  148/99 (115)


 


Pulse Ox 95 95  100


 


O2 Delivery Room Air Room Air Room Air Room Air


 


    





    





 3/11/19 3/11/19 3/11/19 3/11/19





 03:00 07:00 07:45 11:10


 


Temp 99.7 97.9  97.9





 99.7 97.9  97.9


 


Pulse 105 98  98


 


Resp 18 18  18


 


B/P (MAP) 145/95 (112) 143/84 (103)  147/87 (107)


 


Pulse Ox 99 99  96


 


O2 Delivery Room Air Room Air Room Air Room Air














Intake and Output   


 


 3/10/19 3/10/19 3/11/19





 14:59 22:59 06:59


 


Intake Total 900 ml 300 ml 


 


Output Total   0 ml


 


Balance 900 ml 300 ml 0 ml

















BUDDY NAZARIO MD Mar 11, 2019 13:48

## 2019-03-11 NOTE — NUR
SW following. Discussed with RN, pt is from home alone, independent. RN advised no SW needs 
at this time. SW will continue to follow.

## 2019-03-12 VITALS — DIASTOLIC BLOOD PRESSURE: 89 MMHG | SYSTOLIC BLOOD PRESSURE: 128 MMHG

## 2019-03-12 VITALS
SYSTOLIC BLOOD PRESSURE: 140 MMHG | DIASTOLIC BLOOD PRESSURE: 88 MMHG | SYSTOLIC BLOOD PRESSURE: 140 MMHG | DIASTOLIC BLOOD PRESSURE: 88 MMHG | SYSTOLIC BLOOD PRESSURE: 140 MMHG | DIASTOLIC BLOOD PRESSURE: 88 MMHG

## 2019-03-12 VITALS — SYSTOLIC BLOOD PRESSURE: 125 MMHG | DIASTOLIC BLOOD PRESSURE: 80 MMHG

## 2019-03-12 LAB
IGG SERPL-MCNC: 1090 MG/DL (ref 700–1600)
IGG1 SER-MCNC: 623 MG/DL (ref 248–810)
IGG2 SER-MCNC: 378 MG/DL (ref 130–555)
IGG3 SER-MCNC: 106 MG/DL (ref 15–102)
IGG4 SER-MCNC: 13 MG/DL (ref 2–96)

## 2019-03-12 RX ADMIN — LISINOPRIL SCH MG: 10 TABLET ORAL at 08:46

## 2019-03-12 RX ADMIN — PANTOPRAZOLE SODIUM SCH MG: 40 TABLET, DELAYED RELEASE ORAL at 06:15

## 2019-03-12 RX ADMIN — LINAGLIPTIN SCH MG: 5 TABLET, FILM COATED ORAL at 08:46

## 2019-03-12 NOTE — NUR
SW following. Discussed with RN and Dr. Nj. Pt likely to discharge home with self care 
today after diet advanced. No SW needs.

## 2019-03-12 NOTE — PDOC
Subjective:


Subjective:


Tolerating regular diet.  Took a pain pill last night - however, pain is much 

better overall.  Says she feels a lot better today.





Objective:


Vital Signs:





 Vital Signs








  Date Time  Temp Pulse Resp B/P (MAP) Pulse Ox O2 Delivery O2 Flow Rate FiO2


 


3/12/19 08:46  94  125/80    


 


3/12/19 07:00 97.9  18  97 Room Air  





 97.9       








Labs:





Laboratory Tests








Test


 3/11/19


11:12 3/11/19


16:57 3/11/19


22:03 3/12/19


05:45


 


Glucose (Fingerstick) 115 mg/dL  93 mg/dL  137 mg/dL  


 


Lipase    1074 U/L 


 


Test


 3/12/19


07:30 


 


 





 


Glucose (Fingerstick) 138 mg/dL    











PE:





GEN: NAD


LUNGS: CTAB


HEART: RRR


ABD: NABS, S/ND/NT


NEURO/PSYCH: A & O 3





A/P:


Pancreatitis - ?related to Trulicity.... note getting Tradjenta here


-s/p kirit, no alcohol, Ca++ and lipids ok





--


Lipase a little more elevated today but pain improved, tolerating diet.


DC per primary, follow-up w/ PCP/endocrinology.


EGD and colonoscopy as outpt.











ELSY DOBSON Mar 12, 2019 10:01

## 2019-03-12 NOTE — PDOC3
Discharge Summary


Visit Information


Date of Admission:  Mar 10, 2019


Date of Discharge:  Mar 12, 2019


Admitting Diagnosis:  acute pancreatitis


Final Diagnosis


acute pancreatitis resolved 


Abdominal pain secondary to the above resolved


Dietary transgression with excess fat in diet


History of GERD


Obesity with a BMI of 35





Brief Hospital Course


Allergies





 Allergies








Coded Allergies Type Severity Reaction Last Updated Verified


 


  No Known Drug Allergies    3/10/19 No








Vital Signs





Vital Signs








  Date Time  Temp Pulse Resp B/P (MAP) Pulse Ox O2 Delivery O2 Flow Rate FiO2


 


3/12/19 11:00 97.9 101 18 140/88 (105) 96 Room Air  





 97.9       








Lab Results





Laboratory Tests








Test


 3/11/19


06:22 3/11/19


07:54 3/11/19


11:12 3/11/19


16:57


 


Total Bilirubin


 0.4 mg/dL


(0.2-1.0) 


 


 





 


Direct Bilirubin


 0.1 mg/dL


(0.0-0.2) 


 


 





 


Aspartate Amino Transf


(AST/SGOT) 14 U/L (15-37) 


 


 


 





 


Alanine Aminotransferase


(ALT/SGPT) 20 U/L (14-59) 


 


 


 





 


Alkaline Phosphatase


 76 U/L


() 


 


 





 


Total Protein


 7.2 g/dL


(6.4-8.2) 


 


 





 


Albumin


 3.2 g/dL


(3.4-5.0) 


 


 





 


Triglycerides Level


 60 mg/dL


(0-150) 


 


 





 


Cholesterol Level


 140 mg/dL


(0-200) 


 


 





 


LDL Cholesterol, Calculated


 92 mg/dL


(0-100) 


 


 





 


VLDL Cholesterol, Calculated


 12 mg/dL


(0-40) 


 


 





 


Non-HDL Cholesterol Calculated


 104 mg/dL


(0-129) 


 


 





 


HDL Cholesterol


 36 mg/dL


(40-60) 


 


 





 


Cholesterol/HDL Ratio 3.9    


 


Lipase


 956 U/L


() 


 


 





 


Glucose (Fingerstick)


 


 110 mg/dL


(70-99) 115 mg/dL


(70-99) 93 mg/dL


(70-99)


 


Test


 3/11/19


22:03 3/12/19


05:45 3/12/19


07:30 3/12/19


10:57


 


Glucose (Fingerstick)


 137 mg/dL


(70-99) 


 138 mg/dL


(70-99) 156 mg/dL


(70-99)


 


Lipase


 


 1074 U/L


() 


 











Laboratory Tests








Test


 3/11/19


16:57 3/11/19


22:03 3/12/19


05:45 3/12/19


07:30


 


Glucose (Fingerstick)


 93 mg/dL


(70-99) 137 mg/dL


(70-99) 


 138 mg/dL


(70-99)


 


Lipase


 


 


 1074 U/L


() 





 


Test


 3/12/19


10:57 


 


 





 


Glucose (Fingerstick)


 156 mg/dL


(70-99) 


 


 











Brief Hospital Course


Patient is a 60 yo female with history of DM on Trulicity for 2 years who had 

been in her usual stated of health until his prior to her admission when she 

started complaining of epigastric pain with radiation to the back. The patient 

describes the pain as a sharp sensation 9-10 out of 10 intensity at the worst 

times intermittent not associated with nausea vomiting or diarrhea. The patient 

describes her diet very rich in cheese. But she has been doing this for a long 

time, the patient denies having fried foods, or any other dietary 

transgressions. The patient also carries a history of GERD on pantoprazole. She 

had a cholecystectomy for gallstones 30 years ago. She denies excessive alcohol 

intake no history of liver disease no history of black tarry stools nor 

hematemesis reported. She was found to have findings consistent with 

pancreatitis on imaging studies and laboratory data which confirms her 

diagnosis. Given that the patient has been on to elicit the this will have to 

be discontinued moving forward


Of care has been explained in detail to the patient addressed all the concerns 

of the best of my abilities





Patient kept nothing by mouth the first 24 hours of her hospital stay. She was 

fluid resuscitated and she responded well to her bowel rest. Patient was able 

to be fed and second hospital stay despite elevated lipase levels patient 

clinically improved her lipid panel was reviewed with her and I encouraged her 

to follow-up in the outpatient setting with her primary care physician in order 

to address this.


She was seen in consultation by GI who recommended EGD and colonoscopy in the 

outpatient setting. The patient is in good spirits to be discharged home, 

dietary counseling has been done prior to discharge CONCERNS WERE ADDRESSED TO 

THE BEST OF MY ABILITIES.








Lungs clear to auscultation bilaterally with good inspiratory effort and


Cardia vascular S1-S2 regular rhythm no murmurs calls or rubs line abdomen soft 

nontender bowel sounds present hepatosplenomegaly appreciated





Discharge Information


Condition at Discharge:  Improved


Follow Up:  Weeks


Disposition/Orders:  D/C to Home


Scheduled


Atorvastatin Calcium (Lipitor) 20 Mg Tablet, 20 MG PO HS for FOR CHOLESTEROL, 

Ref 0 (Reported)


   Entered as Reported by: PAOLA ESTRELLA on 3/11/19 1138


   Last Action: Continued on 3/11/19 1350 by BUDDY NAZARIO MD


Ergocalciferol (Vitamin D2) (Vitamin D2) 50,000 Unit Capsule, 1 CAP PO WEEKLY 

for n, Ref 0 (Reported)


   Entered as Reported by: PAOLA ESTRELLA on 3/11/19 1138


   Last Action: Continued on 3/11/19 1350 by BUDDY NAZARIO MD


Fluticasone Propionate (Fluticasone Propionate Nasal Spray) 16 Gm [place].susp, 2 

SPRAY NS HS for decongestion, #1 Ref 0 (Reported)


   Entered as Reported by: PAOLA ESTRELLA on 3/11/19 1138


   Last Action: Continued on 3/11/19 1350 by BUDDY NAZARIO MD


Gabapentin (Gabapentin **) 300 Mg Capsule, 300 MG PO HS for NEUROGENIC PAIN, (

Reported)


   Entered as Reported by: PAOLA ESTRELLA on 3/11/19 1138


   Last Action: Continued on 3/11/19 1350 by BUDDY NAZARIO MD


Linagliptin (Tradjenta) 5 Mg Tablet, 5 MG PO DAILY for TYPE 2 DIABETES, (

Reported)


   Entered as Reported by: PAOLA ESTRELLA on 3/11/19 1138


   Last Action: Continued on 3/11/19 1352 by BUDDY NAZARIO MD


Lisinopril (Lisinopril) 10 Mg Tablet, 1 TAB PO DAILY for blood press, Ref 0 (

Reported)


   Entered as Reported by: PAOLA ESTRELLA on 3/11/19 1138


   Last Action: Continued on 3/11/19 1350 by BUDDY NAZARIO MD


Metformin Hcl (Metformin Hcl) 500 Mg Tablet, 500 MG PO BIDWMEALS for ANTI-

DIABETIC, Ref 0 (Reported)


   Entered as Reported by: PAOLA ESTRELLA on 3/11/19 1138


   Last Action: Converted on 3/11/19 1350 by BUDDY NAZARIO MD


Pantoprazole Sodium (Protonix) 20 Mg Tablet.dr, 1 TAB PO DAILY for acid reflux, 

#30 (Reported)


   Entered as Reported by: PAOLA ESTRELLA on 3/11/19 1138


   Last Action: Converted on 3/11/19 1350 by BUDDY NAZARIO MD





Scheduled PRN


Cetirizine Hcl (Zyrtec) 10 Mg Capsule, 10 MG PO DAILY PRN for ALLERGIES, (

Reported)


   Entered as Reported by: PAOLA ESTRELLA on 3/11/19 1138


   Last Action: Converted on 3/11/19 1350 by BUDDY NAZARIO MD


Hydrocodone Bit/Acetaminophen (Hydrocodone-Apap 5-325  **) 1 Tab Tablet, 1 TAB 

PO PRN Q6HRS PRN for PAIN for 3 Days, #12


   Prescribed by: BUDDY NAZARIO MD on 3/12/19 1304











BUDDY NAZARIO MD Mar 12, 2019 13:50

## 2019-10-23 ENCOUNTER — HOSPITAL ENCOUNTER (OUTPATIENT)
Dept: HOSPITAL 61 - MAMMO | Age: 59
Discharge: HOME | End: 2019-10-23
Attending: INTERNAL MEDICINE
Payer: COMMERCIAL

## 2019-10-23 DIAGNOSIS — Z12.31: Primary | ICD-10-CM

## 2019-10-23 DIAGNOSIS — N64.89: ICD-10-CM

## 2019-10-23 PROCEDURE — 77067 SCR MAMMO BI INCL CAD: CPT

## 2019-10-23 NOTE — RAD
DATE: 10/23/2019.



EXAM: DIGITAL SCREEN BILAT W/CAD.



HISTORY: Routine mammographic screening.



COMPARISON: 10/12/2018.



This study was interpreted with the benefit of Computerized Aided Detection

(CAD).



FINDINGS:



Breast Density:  SCATTERED The breast parenchyma shows scattered

fibroglandular densities. Breast parenchyma level B..



A few scattered calcifications are benign. There are no suspicious masses,

microcalcifications or architectural distortion. The parenchymal pattern is

stable.



BI-RADS CATEGORY: 2 BENIGN FINDING(S).



RECOMMENDED FOLLOW-UP: 12M 12 MONTH FOLLOW-UP.



PQRS compliance statement: Patient information was entered into a reminder

system with a target due date 10/23/2020 for the next mammogram.



Mammography is a sensitive method for finding small breast cancers, but it

does not detect them all and is not a substitute for careful clinical

examination.  A negative mammogram does not negate a clinically suspicious

finding and should not result in delay in biopsying a clinically suspicious

abnormality.



"Our facility is accredited by the [employer] Mammography

Program."

## 2021-02-01 ENCOUNTER — HOSPITAL ENCOUNTER (OUTPATIENT)
Dept: HOSPITAL 61 - MAMMO | Age: 61
End: 2021-02-01
Attending: INTERNAL MEDICINE
Payer: COMMERCIAL

## 2021-02-01 DIAGNOSIS — Z12.31: Primary | ICD-10-CM

## 2021-02-01 PROCEDURE — 77067 SCR MAMMO BI INCL CAD: CPT

## 2021-02-02 NOTE — RAD
DATE: 2/1/2021 2:35 PM



EXAM: DIGITAL SCREEN BILAT W/CAD



HISTORY:  Screening



COMPARISON: 10/23/2019



Bilateral full field craniocaudal and mediolateral oblique images were

obtained using digital technique.



This study was interpreted with the benefit of Computerized Aided Detection

(CAD).





FINDINGS:



Breast Density: SCATTERED  The breast parenchyma shows scattered

fibroglandular densities. Breast parenchyma level B





No suspicious masses, microcalcifications or architectural distortion is

present to suggest malignancy in either breast.





The visualized axillae are unremarkable. 



IMPRESSION: No mammographic evidence of malignancy. 



BI-RADS CATEGORY: 1 NEGATIVE



RECOMMENDED FOLLOW-UP: 12M 12 MONTH FOLLOW-UP Annual screening mammography is

recommended, unless clinically indicated sooner based on symptoms or change in

physical exam.



PQRS compliance statement: Patient information was entered into a reminder

system with a target due date for the next mammogram.



Mammography is a sensitive method for finding small breast cancers, but it

does not detect them all and is not a substitute for careful clinical

examination.  A negative mammogram does not negate a clinically suspicious

finding and should not result in delay in biopsying a clinically suspicious

abnormality.



"Our facility is accredited by the [employer] Mammography

Program."